# Patient Record
Sex: FEMALE | Race: WHITE
[De-identification: names, ages, dates, MRNs, and addresses within clinical notes are randomized per-mention and may not be internally consistent; named-entity substitution may affect disease eponyms.]

---

## 2019-02-16 ENCOUNTER — HOSPITAL ENCOUNTER (EMERGENCY)
Dept: HOSPITAL 31 - C.ER | Age: 69
Discharge: HOME | End: 2019-02-16
Payer: SELF-PAY

## 2019-02-16 VITALS — HEART RATE: 62 BPM | RESPIRATION RATE: 20 BRPM | SYSTOLIC BLOOD PRESSURE: 116 MMHG | DIASTOLIC BLOOD PRESSURE: 79 MMHG

## 2019-02-16 VITALS — OXYGEN SATURATION: 98 %

## 2019-02-16 DIAGNOSIS — R10.9: Primary | ICD-10-CM

## 2019-02-16 LAB
ALBUMIN SERPL-MCNC: 4.4 G/DL (ref 3.5–5)
ALBUMIN/GLOB SERPL: 1.2 {RATIO} (ref 1–2.1)
ALT SERPL-CCNC: 28 U/L (ref 9–52)
AST SERPL-CCNC: 29 U/L (ref 14–36)
BASOPHILS # BLD AUTO: 0 K/UL (ref 0–0.2)
BASOPHILS NFR BLD: 0.3 % (ref 0–2)
BUN SERPL-MCNC: 13 MG/DL (ref 7–17)
CALCIUM SERPL-MCNC: 9.4 MG/DL (ref 8.6–10.4)
EOSINOPHIL # BLD AUTO: 0 K/UL (ref 0–0.7)
EOSINOPHIL NFR BLD: 0.1 % (ref 0–4)
ERYTHROCYTE [DISTWIDTH] IN BLOOD BY AUTOMATED COUNT: 14.4 % (ref 11.5–14.5)
GFR NON-AFRICAN AMERICAN: > 60
HGB BLD-MCNC: 12.8 G/DL (ref 11–16)
LIPASE: 100 U/L (ref 23–300)
LYMPHOCYTES # BLD AUTO: 1.1 K/UL (ref 1–4.3)
LYMPHOCYTES NFR BLD AUTO: 27.6 % (ref 20–40)
MCH RBC QN AUTO: 27.7 PG (ref 27–31)
MCHC RBC AUTO-ENTMCNC: 32.4 G/DL (ref 33–37)
MCV RBC AUTO: 85.5 FL (ref 81–99)
MONOCYTES # BLD: 0.3 K/UL (ref 0–0.8)
MONOCYTES NFR BLD: 7.7 % (ref 0–10)
NEUTROPHILS # BLD: 2.5 K/UL (ref 1.8–7)
NEUTROPHILS NFR BLD AUTO: 64.3 % (ref 50–75)
NRBC BLD AUTO-RTO: 0.1 % (ref 0–2)
PLATELET # BLD: 150 K/UL (ref 130–400)
PMV BLD AUTO: 9.3 FL (ref 7.2–11.7)
RBC # BLD AUTO: 4.64 MIL/UL (ref 3.8–5.2)
WBC # BLD AUTO: 3.8 K/UL (ref 4.8–10.8)

## 2019-02-16 PROCEDURE — 99283 EMERGENCY DEPT VISIT LOW MDM: CPT

## 2019-02-16 PROCEDURE — 71046 X-RAY EXAM CHEST 2 VIEWS: CPT

## 2019-02-16 PROCEDURE — 85025 COMPLETE CBC W/AUTO DIFF WBC: CPT

## 2019-02-16 PROCEDURE — 83690 ASSAY OF LIPASE: CPT

## 2019-02-16 PROCEDURE — 93005 ELECTROCARDIOGRAM TRACING: CPT

## 2019-02-16 PROCEDURE — 96374 THER/PROPH/DIAG INJ IV PUSH: CPT

## 2019-02-16 PROCEDURE — 80053 COMPREHEN METABOLIC PANEL: CPT

## 2019-02-16 NOTE — RAD
Date of service: 



02/16/2019



HISTORY:

 abd pain 



COMPARISON:

No prior.



TECHNIQUE:

Chest PA and lateral



FINDINGS:



LUNGS:

No active pulmonary disease.



PLEURA:

No significant pleural effusion identified. No pneumothorax apparent.



CARDIOVASCULAR:

No aortic atherosclerotic calcification present.



Normal cardiac size. No pulmonary vascular congestion. 



OSSEOUS STRUCTURES:

No significant abnormalities.



VISUALIZED UPPER ABDOMEN:

Normal.



OTHER FINDINGS:

None.



IMPRESSION:

No active disease.

## 2019-02-16 NOTE — C.PDOC
History Of Present Illness





68 year old female presents to the ED for evaluation of epigastric discomfort 

for 3 months. The patient reports when eating, the food gets stuck for a few 

seconds before passing. She notes she is from [place] where an endoscopy was pe

rformed that showed epigastric reflux. Denies chest pain, shortness of breath, 

nausea, vomiting, and any other associated symptoms. 





Time Seen by Provider: 19 10:43


Chief Complaint (Nursing): Abdominal Pain


History Per: Patient


History/Exam Limitations: no limitations


Onset/Duration Of Symptoms: Days (x3 months)


Current Symptoms Are (Timing): Still Present


Location Of Pain/Discomfort: Epigastric


Associated Symptoms: denies: Nausea, Vomiting


Recent travel outside of the [place]: No





Past Medical History


Reviewed: Historical Data, Nursing Documentation, Vital Signs


Vital Signs: 





                                Last Vital Signs











Temp  9.2 F L  19 10:26


 


Pulse  71   19 10:26


 


Resp  16   19 10:26


 


BP  120/83   19 10:26


 


Pulse Ox  98   19 10:26














- Medical History


PMH: Gastritis


Family History: States: Unknown Family Hx





- Social History


Hx Alcohol Use: No


Hx Substance Use: No





Review Of Systems


Except As Marked, All Systems Reviewed And Found Negative.


Gastrointestinal: Positive for: Abdominal Pain





Physical Exam





- Physical Exam


Appears: Non-toxic, No Acute Distress


Skin: Warm, Dry


Head: Atraumatic, Normacephalic


Eye(s): bilateral: Normal Inspection, PERRL, EOMI


Oral Mucosa: Moist


Neck: Normal ROM


Chest: Symmetrical, No Deformity


Cardiovascular: Rhythm Regular, No Murmur


Respiratory: Normal Breath Sounds, No Rales, No Rhonchi, No Wheezing


Gastrointestinal/Abdominal: Normal Exam, Soft, No Tenderness


Extremity: Bilateral: Atraumatic, Normal Color And Temperature, Normal ROM


Neurological/Psych: Oriented x3, Normal Speech





ED Course And Treatment





- Laboratory Results


Result Diagrams: 


                                 19 11:02





                                 19 11:02


Lab Results: 





                                        











Total Bilirubin  0.6 mg/dL (0.2-1.3)   19  11:02    


 


AST  29 U/L (14-36)   19  11:02    


 


ALT  28 U/L (9-52)   19  11:02    


 


Alkaline Phosphatase  94 U/L ()   19  11:02    


 


Total Protein  8.0 g/dL (6.3-8.3)   19  11:02    


 


Albumin  4.4 g/dL (3.5-5.0)   19  11:02    


 


Globulin  3.6 gm/dL (2.2-3.9)   19  11:02    


 


Albumin/Globulin Ratio  1.2  (1.0-2.1)   19  11:02    








                                        











Lipase  100 U/L ()   19  11:02    











ECG: Interpreted By Me, Viewed By Me


ECG Rhythm: Sinus Rhythm


Interpretation Of ECG: Normal interval.  Normal axis.  Q wave in lead II and 

AVF.  Nonspecific T wave changes.


Rate From EC


O2 Sat by Pulse Oximetry: 98





- Other Rad


  ** CXR


X-Ray: Viewed By Me, Read By Radiologist


Interpretation: FINDINGS:  LUNGS:  No active pulmonary disease.  PLEURA:  No 

significant pleural effusion identified. No pneumothorax apparent.  

CARDIOVASCULAR:  No aortic atherosclerotic calcification present.  Normal 

cardiac size. No pulmonary vascular congestion.  OSSEOUS STRUCTURES:  No 

significant abnormalities.  VISUALIZED UPPER ABDOMEN:  Normal.  OTHER FINDINGS: 

None.  IMPRESSION:  No active disease.





Medical Decision Making


Medical Decision Making: 





Initial plan: 


-EKG 


-Blood sent. 


-CXR 


-Protonix 





Progress/Update: 


Patient is stable for discharge home. 


Prescribed Protonix and advised to return if symptoms worsen. 








Disposition


Counseled Patient/Family Regarding: Studies Performed, Diagnosis, Need For 

Followup, Rx Given





- Disposition


Referrals: 


Warren Barriga MD [Staff Provider] - 


Disposition: HOME/ ROUTINE


Disposition Time: 11:47


Condition: STABLE


Additional Instructions: 


follow up with gi doctor within 2 days


call to make an appointment


take medications as prescribed


return to ER if symptoms worsens or progress 


Prescriptions: 


Pantoprazole Sodium [Protonix] 40 mg PO DAILY #20 ect


Instructions:  Acid Reflux (Gastroesophageal Reflux Disease), Adult (DC), 

Dyspepsia (DC)


Forms:  [facility] (English), General Discharge Instructions





- Clinical Impression


Clinical Impression: 


 Abdominal pain








- Scribe Statement


The provider has reviewed the documentation as recorded by the Scribe (Radha Steinberg)


Provider Attestation: 





All medical record entries made by the Scribe were at my direction and persona

lly dictated by me. I have reviewed the chart and agree that the record 

accurately reflects my personal performance of the history, physical exam, 

medical decision making, and the department course for this patient. I have also

 personally directed, reviewed, and agree with the discharge instructions and 

disposition.